# Patient Record
Sex: MALE | Race: WHITE | NOT HISPANIC OR LATINO | Employment: FULL TIME | URBAN - METROPOLITAN AREA
[De-identification: names, ages, dates, MRNs, and addresses within clinical notes are randomized per-mention and may not be internally consistent; named-entity substitution may affect disease eponyms.]

---

## 2017-11-09 ENCOUNTER — HOSPITAL ENCOUNTER (EMERGENCY)
Facility: HOSPITAL | Age: 32
Discharge: HOME | End: 2017-11-09
Attending: EMERGENCY MEDICINE
Payer: COMMERCIAL

## 2017-11-09 VITALS
TEMPERATURE: 98.3 F | RESPIRATION RATE: 18 BRPM | HEART RATE: 64 BPM | BODY MASS INDEX: 30.34 KG/M2 | OXYGEN SATURATION: 96 % | SYSTOLIC BLOOD PRESSURE: 154 MMHG | WEIGHT: 216.71 LBS | DIASTOLIC BLOOD PRESSURE: 66 MMHG | HEIGHT: 71 IN

## 2017-11-09 DIAGNOSIS — Z20.3 RABIES EXPOSURE: Primary | ICD-10-CM

## 2017-11-09 PROCEDURE — 90675 RABIES VACCINE IM: CPT | Performed by: PHYSICIAN ASSISTANT

## 2017-11-09 PROCEDURE — 90471 IMMUNIZATION ADMIN: CPT

## 2017-11-09 PROCEDURE — 96372 THER/PROPH/DIAG INJ SC/IM: CPT

## 2017-11-09 PROCEDURE — 90376 RABIES IG HEAT TREATED: CPT | Performed by: PHYSICIAN ASSISTANT

## 2017-11-09 PROCEDURE — 99283 EMERGENCY DEPT VISIT LOW MDM: CPT

## 2017-11-09 RX ORDER — ACETAMINOPHEN, ASPIRIN AND CAFFEINE 250; 250; 65 MG/1; MG/1; MG/1
1 TABLET, FILM COATED ORAL EVERY 6 HOURS PRN
COMMUNITY

## 2017-11-09 RX ADMIN — HUMAN RABIES VIRUS IMMUNE GLOBULIN 1965 UNITS: 150 INJECTION, SOLUTION INTRAMUSCULAR at 19:10

## 2017-11-09 RX ADMIN — RABIES VACCINE 1 ML: KIT at 19:14

## 2017-11-09 NOTE — ED PROVIDER NOTES
History  Chief Complaint   Patient presents with    Dog Bite     Pt presents to the ED with dog bit onset Monday night, pt was running at night when a passerby's dog bit him on the right leg  No information on the dog available  This 76-year-old white male presents emergency room for rabies vaccination  He was running, 4 days ago and was bitten by a stray dog  He has no access to his animal   He was seen by his regular doctor  Was given a tetanus immunization  He has no complaints at the wound site  His past medical history is negative  He has no fever chills  History provided by:  Patient  Rash   Location: right thigh  Quality: painful    Pain details:     Quality:  Aching    Severity:  Mild    Onset quality:  Sudden    Timing:  Constant    Progression:  Waxing and waning  Severity:  Mild  Onset quality:  Sudden  Duration:  4 days  Chronicity:  New  Context comment:  Dog bite  Worsened by:  Nothing  Ineffective treatments:  None tried  Associated symptoms: no fatigue, no fever, no joint pain and no myalgias        Prior to Admission Medications   Prescriptions Last Dose Informant Patient Reported? Taking?   aspirin-acetaminophen-caffeine (EXCEDRIN MIGRAINE) 250-250-65 MG per tablet   Yes Yes   Sig: Take 1 tablet by mouth every 6 (six) hours as needed for headaches      Facility-Administered Medications: None       History reviewed  No pertinent past medical history  Past Surgical History:   Procedure Laterality Date    EYE SURGERY         History reviewed  No pertinent family history  I have reviewed and agree with the history as documented  Social History   Substance Use Topics    Smoking status: Never Smoker    Smokeless tobacco: Never Used    Alcohol use Yes      Comment: Socially        Review of Systems   Constitutional: Negative for activity change, appetite change, chills, fatigue and fever  Musculoskeletal: Negative for arthralgias, gait problem and myalgias     Skin: Positive for color change and wound  Negative for rash  Psychiatric/Behavioral: Negative for confusion  All other systems reviewed and are negative  Physical Exam  ED Triage Vitals [11/09/17 1805]   Temperature Pulse Respirations Blood Pressure SpO2   98 3 °F (36 8 °C) 61 18 144/91 100 %      Temp Source Heart Rate Source Patient Position - Orthostatic VS BP Location FiO2 (%)   Oral Monitor Sitting Right arm --      Pain Score       1           Orthostatic Vital Signs  Vitals:    11/09/17 1805 11/09/17 1932   BP: 144/91 154/66   Pulse: 61 64   Patient Position - Orthostatic VS: Sitting Lying       Physical Exam   Constitutional: He is oriented to person, place, and time  He appears well-developed and well-nourished  No distress  HENT:   Head: Normocephalic  Right Ear: External ear normal    Left Ear: External ear normal    Nose: Nose normal    Eyes: Conjunctivae are normal  Right eye exhibits no discharge  Left eye exhibits no discharge  Pulmonary/Chest: Effort normal    Musculoskeletal: Normal range of motion  He exhibits no tenderness or deformity  Neurological: He is alert and oriented to person, place, and time  Skin: Skin is warm  No rash noted  He is not diaphoretic  No erythema  No pallor  Healing 0 5 cm laceration   Psychiatric: He has a normal mood and affect  His behavior is normal  Judgment and thought content normal    Nursing note and vitals reviewed        ED Medications  Medications   rabies immune globulin, human (IMOGAM RABIES-HT) IM injection 1,965 Units (1,965 Units Infiltration Given 11/9/17 1910)   rabies vaccine, chick embryo (RABAVERT) IM injection 1 mL (1 mL Intramuscular Given 11/9/17 1914)       Diagnostic Studies  Results Reviewed     None                 No orders to display              Procedures  Procedures       Phone Contacts  ED Phone Contact    ED Course  ED Course                                MDM  Number of Diagnoses or Management Options  Rabies exposure: new and does not require workup  Risk of Complications, Morbidity, and/or Mortality  Presenting problems: low  Diagnostic procedures: moderate  Management options: moderate  General comments: Patient presents to the emergency room 4 days after being bit by a stray dog  He was sent by his primary care provider to initiate the rabies vaccination series  He has a he healing laceration on his right lateral thigh  He was given a tetanus shot by his doctor  He was not placed on antibiotics  His wound is healing well  The rake and rapid for were initiated  He will follow up at [address] for his follow-up vaccines on day 3, 7, 14    Patient Progress  Patient progress: stable    CritCare Time    Disposition  Final diagnoses:   Rabies exposure - Healing dog bite right thigh     Time reflects when diagnosis was documented in both MDM as applicable and the Disposition within this note     Time User Action Codes Description Comment    11/9/2017  6:17 PM Keysha Nick Add [Z20 3] Rabies exposure     11/9/2017  6:17 PM Keysha Nick Modify [Z20 3] Rabies exposure Healing dog bite right thigh      ED Disposition     ED Disposition Condition Comment    Discharge  Dhruv Verma discharge to home/self care  Condition at discharge: Good        Follow-up Information     Follow up With Specialties Details Why [id number] Now   In 3 days, 7 days, 14 days         Discharge Medication List as of 11/9/2017  6:21 PM      CONTINUE these medications which have NOT CHANGED    Details   aspirin-acetaminophen-caffeine (EXCEDRIN MIGRAINE) 250-250-65 MG per tablet Take 1 tablet by mouth every 6 (six) hours as needed for headaches, Historical Med           No discharge procedures on file      ED Provider  Electronically Signed by           Forrest Nieves PA-C  11/09/17 6767

## 2017-11-09 NOTE — DISCHARGE INSTRUCTIONS
Rabies   WHAT YOU NEED TO KNOW:   Rabies is a disease that affects the body's central nervous system (brain, spinal cord, and nerves)  Rabies is caused by a virus  You may get the virus if you come into contact with the saliva or other tissue of an infected animal  Rabies infection usually happens through a bite wound  Animals that may spread rabies include dogs, cats, coyotes, raccoons, foxes, skunks, and bats  Rabies develops when the virus enters the skin and goes to the muscles or nerves  DISCHARGE INSTRUCTIONS:   Call 911 for any of the following:   · You have trouble swallowing or slurred speech  · You have double vision, or you see things that are not really there  · You begin twitching, have muscle cramps, or have a seizure  Return to the emergency department if:   · You think you were exposed to rabies  · You were bitten by an animal      · You feel weak, tired, dizzy, confused, restless, or anxious  Contact your healthcare provider if:   · You have a fever  · Your signs and symptoms do not get better after treatment  · You have questions or concerns about rabies and rabies treatment  Medicines:   · Medicines  such as the rabies vaccine or immune globulin may be given  These medicines help your body fight the virus and prevent rabies  · Take your medicine as directed  Contact your healthcare provider if you think your medicine is not helping or if you have side effects  Tell him or her if you are allergic to any medicine  Keep a list of the medicines, vitamins, and herbs you take  Include the amounts, and when and why you take them  Bring the list or the pill bottles to follow-up visits  Carry your medicine list with you in case of an emergency  Follow up with your healthcare provider as directed:  Write down your questions so you remember to ask them during your visits  Prevent rabies:   · Ask your healthcare provider about the rabies vaccine    You may need the vaccine if your work puts you at risk for rabies  You may also need the vaccine if you plan to travel to places where the risk for rabies is high  Ask for more information on rabies shots  · Avoid contact with animals  Do not approach any tame or wild animal that you do not know  Do not try to take them home with you  Cover windows and other openings in your home with screens so wild animals cannot get inside  · Get medical care if you get bitten by an animal   Do this even if the wound is very small  · Get your pet vaccinated against rabies  You will need to do this every 3 years or as directed by your   What to do if an animal bites you:  Clean the bite wound well and cover the wound with a clean bandage  Then contact your healthcare provider  © 2017 [address] Information is for End User's use only and may not be sold, redistributed or otherwise used for commercial purposes  All illustrations and images included in CareNotes® are the copyrighted property of A D [employer] M [employer] Inc  or Jared Ortiz  The above information is an  only  It is not intended as medical advice for individual conditions or treatments  Talk to your doctor, nurse or pharmacist before following any medical regimen to see if it is safe and effective for you

## 2017-11-13 ENCOUNTER — OFFICE VISIT (OUTPATIENT)
Dept: URGENT CARE | Age: 32
End: 2017-11-13
Payer: COMMERCIAL

## 2017-11-13 PROCEDURE — S9083 URGENT CARE CENTER GLOBAL: HCPCS | Performed by: FAMILY MEDICINE

## 2017-11-13 PROCEDURE — G0382 LEV 3 HOSP TYPE B ED VISIT: HCPCS | Performed by: FAMILY MEDICINE

## 2017-11-13 PROCEDURE — 90675 RABIES VACCINE IM: CPT

## 2017-11-14 NOTE — PROGRESS NOTES
Assessment    1  Need for rabies vaccination (V04 5) (Z23)    Plan  Need for rabies vaccination    · RabAvert Intramuscular Suspension Reconstituted; Give 1 ml IM now; To BeDone: [id number]    Discussion/Summary  Discussion Summary:   Continue vaccine series as directed  Medication Side Effects Reviewed: Possible side effects of new medications were reviewed with the patient/guardian today  Understands and agrees with treatment plan: The treatment plan was reviewed with the patient/guardian  The patient/guardian understands and agrees with the treatment plan      Chief Complaint  Chief Complaint Free Text Note Form: Pt is here for #2 in rabies booster series because of a dog bite to the right buttock  History of Present Illness  HPI: [de-identified] year male presents need for rabies vaccine #2  He states that he was jogging 1 week by a woman walking a dog, and the dog bit him on his right buttocks  He states he went to his family physician the next day, was given a Tdap vaccine and was told to go to the ER for the rabies vaccination series  He states he did not obtain the owners information or dog vaccination hx after the incident  He started the rabies vaccine series on 11/9/2017  He denies fevers, chills, headache, dizziness, nausea, vomiting  He feels well otherwise  He did not take the abx that were given to him from his PCP, because he states that the wound healed nicely  Hospital Based Practices Required Assessment:  Pain Assessment  the patient states they do not have pain  Abuse And Domestic Violence Screen   Yes, the patient is safe at home  -- The patient states no one is hurting them  Depression And Suicide Screen  No, the patient has not had thoughts of hurting themself  No, the patient has not felt depressed in the past 7 days  Review of Systems  Focused-Male:  Constitutional: no fever or chills, feels well, no tiredness, no recent weight loss or weight gain    Cardiovascular: no chest pain   Respiratory: no shortness of breath  Integumentary: as noted in HPI  Neurological: no headache,-- no numbness,-- no tingling,-- no confusion-- and-- no dizziness  ROS Reviewed:   ROS reviewed  Active Problems  1  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)    Past Medical History  Active Problems And Past Medical History Reviewed: The active problems and past medical history were reviewed and updated today  Family History  Father    1  Family history of Diabetes Mellitus (V18 0)   2  Family history of Heart Disease (V17 49)   3  Family history of Hypertension (V17 49)  Family History Reviewed: The family history was reviewed and updated today  Social History     · Denied: History of Alcohol Use (History)   · Denied: History of Drug Use   · Never A Smoker  Social History Reviewed: The social history was reviewed and updated today  The social history was reviewed and is unchanged  Surgical History  Surgical History Reviewed: The surgical history was reviewed and updated today  Current Meds   1  Excedrin Migraine TABS; Therapy: (Recorded:13Nov2017) to Recorded  Medication List Reviewed: The medication list was reviewed and updated today  Allergies  1  No Known Drug Allergies    2  Nuts    Vitals  Signs   Recorded: [id number] 12:29PM   Temperature: 98 2 F, Temporal  Heart Rate: 59  Respiration: 16  Blood Pressure: 120 mm Hg, LUE, Sitting  Blood Pressure: 88 mm Hg, LUE, Sitting  Height: 5 ft 11 in  Weight: 216 lb   BMI Calculated: 30 13 kg/m2  BSA Calculated: 2 18 m2  O2 Saturation: 98    Physical Exam   Constitutional  General appearance: No acute distress, well appearing and well nourished  Pulmonary  Respiratory effort: No increased work of breathing or signs of respiratory distress  Auscultation of lungs: Clear to auscultation  Cardiovascular  Auscultation of heart: Normal rate and rhythm, normal S1 and S2, without murmurs     Skin well healed puncture wounds on right buttocks  No pus drainage, erythema or warmth  Psychiatric  Orientation to person, place and time: Normal    Mood and affect: Normal        Future Appointments    Date/Time Provider Specialty Site   01/09/2018 03:30 PM JOHNNY Gupta   Urology [facility] Kwesi Quinn   Electronically signed by : Pan Bradley [facility]; Nov 13 2017 12:53PM EST                       (Author)    Electronically signed by : Sal Arceo DO; Nov 13 2017  3:54PM EST                       (Co-author)

## 2017-11-17 ENCOUNTER — OFFICE VISIT (OUTPATIENT)
Dept: URGENT CARE | Age: 32
End: 2017-11-17
Payer: COMMERCIAL

## 2017-11-17 PROCEDURE — 90675 RABIES VACCINE IM: CPT

## 2017-11-17 PROCEDURE — G0382 LEV 3 HOSP TYPE B ED VISIT: HCPCS | Performed by: FAMILY MEDICINE

## 2017-11-17 PROCEDURE — 96372 THER/PROPH/DIAG INJ SC/IM: CPT | Performed by: FAMILY MEDICINE

## 2017-11-17 PROCEDURE — S9083 URGENT CARE CENTER GLOBAL: HCPCS | Performed by: FAMILY MEDICINE

## 2017-11-19 NOTE — PROGRESS NOTES
Assessment    1  Need for rabies vaccination (V04 5) (Z23)    Discussion/Summary  Discussion Summary:   Rabies vaccination #3 given today  Patient scheduled for his 4th vaccination on 11/23/2017  Understands and agrees with treatment plan: The treatment plan was reviewed with the patient/guardian  The patient/guardian understands and agrees with the treatment plan   Counseling Documentation With Imm: The patient was counseled regarding instructions for management,-- prognosis,-- patient and family education,-- impressions,-- risks and benefits of treatment options,-- importance of compliance with treatment  Follow Up Instructions: Follow Up with your Primary Care Provider in 11/23/2017 days  If your symptoms worsen, go to the nearest [place] [address] Emergency Department  Chief Complaint  Chief Complaint Free Text Note Form: Here today for his 3 rd rabies shot in the series- has not had a problem thus far      History of Present Illness  HPI: Patient is here for his 3rd rabies vaccination  Patients vaccination was due yesterday but is unable to come in for evaluation  He denies any reaction to the previous vaccinations of that a slight sore arm  He denies any redness, swelling, fevers, difficulty breathing, throat swelling, rash, hives  Review of Systems  Focused-Male:  Constitutional: as noted in HPI  Respiratory: as noted in HPI  Musculoskeletal: as noted in HPI  Integumentary: as noted in HPI  Active Problems  1  Need for rabies vaccination (V04 5) (Z23)   2  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)    Family History  Father    1  Family history of Diabetes Mellitus (V18 0)   2  Family history of Heart Disease (V17 49)   3  Family history of Hypertension (V17 49)  Family History Reviewed: The family history was reviewed and updated today  Social History     · Denied: History of Alcohol Use (History)   · Denied: History of Drug Use   · Never A Smoker  Social History Reviewed:  The social history was reviewed and updated today  Current Meds   1  Excedrin Migraine TABS; Therapy: (Recorded:13Nov2017) to Recorded  Medication List Reviewed: The medication list was reviewed and updated today  Allergies  1  No Known Drug Allergies    2  Nuts    Vitals  Signs   Recorded: [id number] 12:25PM   Temperature: 98 4 F  Heart Rate: 64  Respiration: 20  Systolic: 929  Diastolic: 72  Height: 5 ft 11 in  Weight: 216 lb   BMI Calculated: 30 13  BSA Calculated: 2 18  O2 Saturation: 98  Pain Scale: 0    Physical Exam   Constitutional  General appearance: No acute distress, well appearing and well nourished  Eyes  Conjunctiva and lids: No swelling, erythema, or discharge  Pupils and irises: Equal, round and reactive to light  Skin  Skin and subcutaneous tissue: Normal without rashes or lesions  -- No erythema or swelling at the site of the prior vaccination  Psychiatric  Orientation to person, place and time: Normal    Mood and affect: Normal        Future Appointments    Date/Time Provider Specialty Site   01/09/2018 03:30 PM JOHNNY Pompa   Urology [facility] Fer Lima   Electronically signed by : Gallo Granda, [facility]; Nov 17 2017 12:55PM EST                       (Author)    Electronically signed by : Viviane Smith DO; Nov 17 2017  4:07PM EST                       (Co-author)

## 2017-11-27 ENCOUNTER — OFFICE VISIT (OUTPATIENT)
Dept: URGENT CARE | Age: 32
End: 2017-11-27
Payer: COMMERCIAL

## 2017-11-27 PROCEDURE — 96372 THER/PROPH/DIAG INJ SC/IM: CPT | Performed by: FAMILY MEDICINE

## 2017-11-27 PROCEDURE — 90675 RABIES VACCINE IM: CPT

## 2017-11-27 PROCEDURE — G0381 LEV 2 HOSP TYPE B ED VISIT: HCPCS | Performed by: FAMILY MEDICINE

## 2017-11-27 PROCEDURE — S9083 URGENT CARE CENTER GLOBAL: HCPCS | Performed by: FAMILY MEDICINE

## 2017-11-28 NOTE — PROGRESS NOTES
Assessment    1  Need for rabies vaccination (V04 5) (Z23)    Discussion/Summary  Discussion Summary:   Tylenol, or Advil/ Motrin as needed  recheck if any problems  Understands and agrees with treatment plan: The treatment plan was reviewed with the patient/guardian  The patient/guardian understands and agrees with the treatment plan   Counseling Documentation With Imm: The patient was counseled regarding  Chief Complaint  Chief Complaint Free Text Note Form: Last rabies shot  left deltoid  History of Present Illness  HPI: Patient here for his 4th rabies vaccine injection - patient states he had chills with his 3rd rabies vaccine injection (no other problems per patient)   Hospital Based Practices Required Assessment:  Pain Assessment  the patient states they do not have pain  Abuse And Domestic Violence Screen   No, the patient is not safe at home  -- The patient states no one is hurting them  Depression And Suicide Screen  No, the patient has not had thoughts of hurting themself  No, the patient has not felt depressed in the past 7 days  Review of Systems  Focused-Male:  Constitutional: chills, but-- as noted in HPI   ENT: no complaints of earache, no loss of hearing, no nosebleeds or nasal discharge, no sore throat or hoarseness  Cardiovascular: no complaints of slow or fast heart rate, no chest pain, no palpitations, no leg claudication or lower extremity edema  Respiratory: no complaints of shortness of breath, no wheezing or cough, no dyspnea on exertion, no orthopnea or PND  Gastrointestinal: no complaints of abdominal pain, no constipation, no nausea or vomiting, no diarrhea or bloody stools  Genitourinary: no complaints of dysuria or incontinence, no hesitancy, no nocturia, no genital lesion, no inadequacy of penile erection  Musculoskeletal: no complaints of arthralgia, no myalgia, no joint swelling or stiffness, no limb pain or swelling    Integumentary: no complaints of skin rash or lesion, no itching or dry skin, no skin wounds  Neurological: no complaints of headache, no confusion, no numbness or tingling, no dizziness or fainting  ROS Reviewed:   ROS reviewed  Active Problems  1  Need for rabies vaccination (V04 5) (Z23)   2  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)    Past Medical History  Active Problems And Past Medical History Reviewed: The active problems and past medical history were reviewed and updated today  Family History  Father    1  Family history of Diabetes Mellitus (V18 0)   2  Family history of Heart Disease (V17 49)   3  Family history of Hypertension (V17 49)  Family History Reviewed: The family history was reviewed and updated today  Social History     · Denied: History of Alcohol Use (History)   · Denied: History of Drug Use   · Never A Smoker  Social History Reviewed: The social history was reviewed and updated today  The social history was reviewed and is unchanged  Surgical History  Surgical History Reviewed: The surgical history was reviewed and updated today  Current Meds   1  Excedrin Migraine TABS; Therapy: (Recorded:13Nov2017) to Recorded  Medication List Reviewed: The medication list was reviewed and updated today  Allergies  1  No Known Drug Allergies  2  Nuts    Vitals  Signs   Recorded: 27Nov2017 12:27PM   Temperature: 98 1 F, Temporal  Heart Rate: 60  Respiration: 18  Systolic: 376  Diastolic: 80  Height: 5 ft 11 in  Weight: 216 lb   BMI Calculated: 30 13  BSA Calculated: 2 18  O2 Saturation: 98    Future Appointments    Date/Time Provider Specialty Site   01/09/2018 03:30 PM JOHNNY Amador   Urology [facility] Kelly Ordoñez   Electronically signed by : Altaf Wiley DO; Nov 27 2017 12:37PM EST                       (Author)

## 2018-01-09 ENCOUNTER — GENERIC CONVERSION - ENCOUNTER (OUTPATIENT)
Dept: OTHER | Facility: OTHER | Age: 33
End: 2018-01-09

## 2018-01-24 VITALS
WEIGHT: 221.25 LBS | SYSTOLIC BLOOD PRESSURE: 124 MMHG | HEART RATE: 64 BPM | HEIGHT: 72 IN | BODY MASS INDEX: 29.97 KG/M2 | DIASTOLIC BLOOD PRESSURE: 82 MMHG

## 2018-02-06 PROBLEM — N50.89 LUMP IN SCROTUM: Status: ACTIVE | Noted: 2018-01-09

## 2018-02-06 NOTE — PROGRESS NOTES
2/9/2018    Tamiko Welch IV  1985  [id number]    Discussion and Plan    Patient underwent successful excision of a penile inclusion cyst   Post procedure instructions provided  Script for Cialis sent to pharmacy  1  Testicular hypogonadism     2  Lump in scrotum     3  Erectile dysfunction, unspecified erectile dysfunction type  tadalafil (CIALIS) 5 MG tablet       Assessment      Patient Active Problem List   Diagnosis    Lump in scrotum    Testicular hypogonadism       History of Present Illness    Les Sims is a 33 y o  male seen today in regards to a history of hypogonadism  He reports a several year history of variable libido and erectile dysfunction  Otherwise normal energy  No urinary complaints  Denies prior steroid or illicit drug use  Normal pubertal development  He since has been treated with Cialis and has had excellent results  He more recently noted a firm lump on the distal aspect of the penis  Occasionally tender  Has been present for some time  No urinary complaints  No prior sexually transmitted diseases  Past Medical History  History reviewed  No pertinent past medical history  Past Social History  Past Surgical History:   Procedure Laterality Date    EYE SURGERY         Past Family History  Family History   Problem Relation Age of Onset    Diabetes Father     Heart disease Father     Hypertension Father        Past Social history  Social History     Social History    Marital status: Single     Spouse name: N/A    Number of children: N/A    Years of education: N/A     Occupational History    Not on file       Social History Main Topics    Smoking status: Never Smoker    Smokeless tobacco: Never Used    Alcohol use Yes      Comment: Socially    Drug use: No    Sexual activity: Not on file     Other Topics Concern    Not on file     Social History Narrative    No narrative on file       Current Medications  Current Outpatient Prescriptions   Medication Sig Dispense Refill    aspirin-acetaminophen-caffeine (EXCEDRIN MIGRAINE) 250-250-65 MG per tablet Take 1 tablet by mouth every 6 (six) hours as needed for headaches      aspirin-acetaminophen-caffeine (EXCEDRIN MIGRAINE) 250-250-65 MG per tablet Take by mouth as needed      tadalafil (CIALIS) 5 MG tablet Take 1 tablet (5 mg total) by mouth daily as needed for erectile dysfunction 10 tablet 6     No current facility-administered medications for this visit  Allergies  Allergies   Allergen Reactions    Nuts        Past Medical History, Social History, Family History, medications and allergies were reviewed  Review of Systems  Review of Systems   Constitutional: Negative  HENT: Negative  Eyes: Negative  Respiratory: Negative  Cardiovascular: Negative  Gastrointestinal: Negative  Endocrine: Negative  Musculoskeletal: Negative  Skin: Negative  Neurological: Negative  Hematological: Negative  Psychiatric/Behavioral: Negative  Vitals  Vitals:    02/09/18 0730   BP: 126/90   Pulse: 72   Weight: 102 kg (225 lb 6 4 oz)   Height: 6' (1 829 m)         Physical Exam    Physical Exam   Constitutional: He is oriented to person, place, and time  He appears well-developed and well-nourished  HENT:   Head: Normocephalic and atraumatic  Eyes: Pupils are equal, round, and reactive to light  Neck: Normal range of motion  Cardiovascular: Normal rate, regular rhythm and normal heart sounds  Pulmonary/Chest: Effort normal and breath sounds normal  No accessory muscle usage  No respiratory distress  Abdominal: Soft  Normal appearance and bowel sounds are normal  There is no tenderness  Genitourinary: Rectum normal, prostate normal and penis normal  No penile tenderness  Musculoskeletal: Normal range of motion  Neurological: He is alert and oriented to person, place, and time  Skin: Skin is warm, dry and intact  Psychiatric: He has a normal mood and affect   His speech is normal  Cognition and memory are normal    Nursing note and vitals reviewed  PROCEDURE/  EXCISION PENILE LESION    After informed consent, patient was placed supine in the procedure suite  He was sterilely prepped and draped in the usual fashion  An inclusion cyst of approximately 7 mm was noted at the dorsal phallus near the coronal sulcus  1% lidocaine without epinephrine was instilled in this area  Excision of the formation lesion was performed with a 15 blade and sent for permanent section  Cautery of the wound was performed followed by closure with 3-0 chromic suture  Sterile dressing was applied  Patient tolerated the procedure well

## 2018-02-07 RX ORDER — ACETAMINOPHEN, ASPIRIN AND CAFFEINE 250; 250; 65 MG/1; MG/1; MG/1
TABLET, FILM COATED ORAL AS NEEDED
COMMUNITY
End: 2018-04-03 | Stop reason: SDUPTHER

## 2018-02-09 ENCOUNTER — PROCEDURE VISIT (OUTPATIENT)
Dept: UROLOGY | Facility: CLINIC | Age: 33
End: 2018-02-09
Payer: COMMERCIAL

## 2018-02-09 VITALS
BODY MASS INDEX: 30.53 KG/M2 | DIASTOLIC BLOOD PRESSURE: 90 MMHG | HEIGHT: 72 IN | SYSTOLIC BLOOD PRESSURE: 126 MMHG | WEIGHT: 225.4 LBS | HEART RATE: 72 BPM

## 2018-02-09 DIAGNOSIS — N50.89 LUMP IN SCROTUM: ICD-10-CM

## 2018-02-09 DIAGNOSIS — E29.1 TESTICULAR HYPOGONADISM: Primary | ICD-10-CM

## 2018-02-09 DIAGNOSIS — N52.9 ERECTILE DYSFUNCTION, UNSPECIFIED ERECTILE DYSFUNCTION TYPE: ICD-10-CM

## 2018-02-09 DIAGNOSIS — N48.9 PENILE LESION: ICD-10-CM

## 2018-02-09 PROCEDURE — 54060 EXCISION OF PENIS LESION(S): CPT | Performed by: UROLOGY

## 2018-02-09 PROCEDURE — 88342 IMHCHEM/IMCYTCHM 1ST ANTB: CPT | Performed by: PATHOLOGY

## 2018-02-09 PROCEDURE — 88305 TISSUE EXAM BY PATHOLOGIST: CPT | Performed by: UROLOGY

## 2018-02-09 PROCEDURE — 88305 TISSUE EXAM BY PATHOLOGIST: CPT | Performed by: PATHOLOGY

## 2018-02-09 PROCEDURE — 88342 IMHCHEM/IMCYTCHM 1ST ANTB: CPT | Performed by: UROLOGY

## 2018-02-09 PROCEDURE — 88341 IMHCHEM/IMCYTCHM EA ADD ANTB: CPT | Performed by: UROLOGY

## 2018-02-09 PROCEDURE — 88341 IMHCHEM/IMCYTCHM EA ADD ANTB: CPT | Performed by: PATHOLOGY

## 2018-02-09 RX ORDER — TADALAFIL 5 MG/1
5 TABLET ORAL DAILY PRN
Qty: 10 TABLET | Refills: 6 | Status: SHIPPED | OUTPATIENT
Start: 2018-02-09 | End: 2018-04-03 | Stop reason: SDUPTHER

## 2018-02-09 NOTE — PATIENT INSTRUCTIONS
Dermal Cyst Excision   WHAT YOU NEED TO KNOW:   A dermal cyst excision is a procedure to remove a cyst that has grown under your skin  DISCHARGE INSTRUCTIONS:   Medicines:   · Medicines  help decrease pain or swelling, and prevent an infection caused by bacteria  · Take your medicine as directed  Contact your healthcare provider if you think your medicine is not helping or if you have side effects  Tell him or her if you are allergic to any medicine  Keep a list of the medicines, vitamins, and herbs you take  Include the amounts, and when and why you take them  Bring the list or the pill bottles to follow-up visits  Carry your medicine list with you in case of an emergency  Wound care:  Care for your wound as directed  Carefully wash the wound with soap and water  Pat the area dry with a clean towel if you do not have a bandage  Dry the area and put on new, clean bandages as directed  Change your bandages when they get wet or dirty  Follow up with your healthcare provider or surgeon as directed: You may need to return to have your stitches removed  Write down your questions so you remember to ask them during your visits  Contact your healthcare provider or surgeon if:   · You have a fever  · You have worse pain, even after you take medicine  · You have a new cyst     · You have bruises that do not go away  · The skin around your wound is numb  · You have questions or concerns about your condition or care  Seek care immediately or call 911 if:   · You have severe pain  · Your stitches come apart  · Blood soaks through your bandage  © 2017 [address] [employer] Information is for End User's use only and may not be sold, redistributed or otherwise used for commercial purposes  All illustrations and images included in CareNotes® are the copyrighted property of [employer] , [employer]  or Jared Ortiz  The above information is an  only   It is not intended as medical advice for individual conditions or treatments  Talk to your doctor, nurse or pharmacist before following any medical regimen to see if it is safe and effective for you

## 2018-03-30 NOTE — PROGRESS NOTES
4/3/2018    Yudelka Gut IV  1985  [id number]    Discussion and Plan    Patient continues to do well status post excision of a penile inclusion cyst   Cialis script renewed  He will return in 1 year for routine visit  1  Testicular hypogonadism    2  Lump in scrotum    3  Erectile dysfunction, unspecified erectile dysfunction type  - tadalafil (CIALIS) 5 MG tablet; Take 1 tablet (5 mg total) by mouth daily as needed for erectile dysfunction  Dispense: 10 tablet; Refill: 12    Assessment      Patient Active Problem List   Diagnosis    Lump in scrotum    Testicular hypogonadism       History of Present Illness    Prince messina is a 33 y o  male seen today in regards to a history of hypogonadism  He reports a several year history of variable libido and erectile dysfunction  Otherwise normal energy  No urinary complaints  Denies prior steroid or illicit drug use  Normal pubertal development  He since has been treated with Cialis and has had excellent results  He more recently noted a firm lump on the distal aspect of the penis  Occasionally tender  Has been present for some time  No urinary complaints  No prior sexually transmitted diseases  Recently underwent in office excision of a inclusion scrotal cyst       Urinary Symptom Assessment      Past Medical History  History reviewed  No pertinent past medical history  Past Social History  Past Surgical History:   Procedure Laterality Date    EYE SURGERY      PENILE CYST REMOVAL  02/09/2018    Dr Ceasar Webb        Past Family History  Family History   Problem Relation Age of Onset    Diabetes Father     Heart disease Father     Hypertension Father        Past Social history  Social History     Social History    Marital status: Single     Spouse name: N/A    Number of children: N/A    Years of education: N/A     Occupational History    Not on file       Social History Main Topics    Smoking status: Never Smoker    Smokeless tobacco: Never Used   Waldemar Roles Alcohol use Yes      Comment: Socially    Drug use: No    Sexual activity: Not on file     Other Topics Concern    Not on file     Social History Narrative    No narrative on file       Current Medications  Current Outpatient Prescriptions   Medication Sig Dispense Refill    aspirin-acetaminophen-caffeine (EXCEDRIN MIGRAINE) 250-250-65 MG per tablet Take 1 tablet by mouth every 6 (six) hours as needed for headaches      tadalafil (CIALIS) 5 MG tablet Take 1 tablet (5 mg total) by mouth daily as needed for erectile dysfunction 10 tablet 12     No current facility-administered medications for this visit  Allergies  Allergies   Allergen Reactions    Nuts        Past Medical History, Social History, Family History, medications and allergies were reviewed  Review of Systems  Review of Systems   Constitutional: Negative  HENT: Negative  Eyes: Negative  Respiratory: Negative  Cardiovascular: Negative  Gastrointestinal: Negative  Endocrine: Negative  Musculoskeletal: Negative  Skin: Negative  Neurological: Negative  Hematological: Negative  Psychiatric/Behavioral: Negative  Vitals  Vitals:    04/03/18 0750   BP: 130/80   BP Location: Left arm   Patient Position: Sitting   Weight: 101 kg (223 lb)   Height: 6' (1 829 m)         Physical Exam    Physical Exam   Constitutional: He is oriented to person, place, and time  He appears well-developed and well-nourished  HENT:   Head: Normocephalic and atraumatic  Eyes: Pupils are equal, round, and reactive to light  Neck: Normal range of motion  Cardiovascular: Normal rate, regular rhythm and normal heart sounds  Pulmonary/Chest: Effort normal and breath sounds normal  No accessory muscle usage  No respiratory distress  Abdominal: Soft  Normal appearance and bowel sounds are normal  There is no tenderness  Genitourinary: Rectum normal, prostate normal and penis normal  No penile tenderness     Genitourinary Comments: Surgical site healing well  Musculoskeletal: Normal range of motion  Neurological: He is alert and oriented to person, place, and time  Skin: Skin is warm, dry and intact  Psychiatric: He has a normal mood and affect  His speech is normal  Cognition and memory are normal    Nursing note and vitals reviewed        Results    No results found for: PSA  No results found for: GLUCOSE, CALCIUM, NA, K, CO2, CL, BUN, CREATININE  No results found for: WBC, HGB, HCT, MCV, PLT    No results found for this or any previous visit (from the past 1 hour(s)) ]

## 2018-04-03 ENCOUNTER — OFFICE VISIT (OUTPATIENT)
Dept: UROLOGY | Facility: CLINIC | Age: 33
End: 2018-04-03
Payer: COMMERCIAL

## 2018-04-03 VITALS
HEIGHT: 72 IN | SYSTOLIC BLOOD PRESSURE: 130 MMHG | WEIGHT: 223 LBS | BODY MASS INDEX: 30.2 KG/M2 | DIASTOLIC BLOOD PRESSURE: 80 MMHG

## 2018-04-03 DIAGNOSIS — N50.89 LUMP IN SCROTUM: ICD-10-CM

## 2018-04-03 DIAGNOSIS — N52.9 ERECTILE DYSFUNCTION, UNSPECIFIED ERECTILE DYSFUNCTION TYPE: ICD-10-CM

## 2018-04-03 DIAGNOSIS — E29.1 TESTICULAR HYPOGONADISM: Primary | ICD-10-CM

## 2018-04-03 PROCEDURE — 99212 OFFICE O/P EST SF 10 MIN: CPT | Performed by: UROLOGY

## 2018-04-03 RX ORDER — TADALAFIL 5 MG/1
5 TABLET ORAL DAILY PRN
Qty: 10 TABLET | Refills: 12 | Status: SHIPPED | OUTPATIENT
Start: 2018-04-03

## 2021-04-05 ENCOUNTER — OFFICE VISIT (OUTPATIENT)
Dept: URGENT CARE | Facility: CLINIC | Age: 36
End: 2021-04-05
Payer: COMMERCIAL

## 2021-04-05 VITALS
HEART RATE: 60 BPM | HEIGHT: 72 IN | SYSTOLIC BLOOD PRESSURE: 138 MMHG | TEMPERATURE: 99 F | DIASTOLIC BLOOD PRESSURE: 76 MMHG | BODY MASS INDEX: 28.44 KG/M2 | RESPIRATION RATE: 15 BRPM | OXYGEN SATURATION: 96 % | WEIGHT: 210 LBS
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
## 2021-04-05 PROCEDURE — 99213 OFFICE O/P EST LOW 20 MIN: CPT | Performed by: NURSE PRACTITIONER

## 2021-04-05 RX ORDER — DOXYCYCLINE 100 MG/1
100 TABLET ORAL 2 TIMES DAILY
Qty: 14 TABLET | Refills: 0 | Status: SHIPPED | OUTPATIENT
Start: 2021-04-05 | End: 2021-04-12

## 2021-04-05 RX ORDER — METRONIDAZOLE 500 MG/1
500 TABLET ORAL EVERY 8 HOURS SCHEDULED
Qty: 21 TABLET | Refills: 0 | Status: SHIPPED | OUTPATIENT
Start: 2021-04-05 | End: 2021-04-12

## 2021-04-05 NOTE — PATIENT INSTRUCTIONS
--Ice, warm soaks  --Motrin as needed for pain  --Take preventative antibiotics as directed  --F/u with animal control as discussed  Go directly to ER if any concern about rabies  --Go directly to ER with any signs of worsening hand infection (redness, pain, streaks, swelling)

## 2021-04-05 NOTE — PROGRESS NOTES
[facility]        NAME: Pretty Soot is a 30 y o  male  : 1985    MRN: [id number]  DATE: 2021  TIME: 6:11 PM    Assessment and Plan   Dog bite, initial encounter [W54  0XXA]  1  Dog bite, initial encounter  doxycycline (ADOXA) 100 MG tablet    metroNIDAZOLE (FLAGYL) 500 mg tablet     --Preventative antibiotic given  Previous AE's from Augmentin  Requesting alternative  Advised monitoring for  S/Sx infection which were reviewed  --UTD with tetanus vaccine  --Reviewed rabies guidelines including observation of animal for 10 days, verification of vaccination records  Advise ER immediately for RIG + vaccine if unable to verify vaccine status and/or animal exhibits unusual behavior during vaccination period and/or S/Sx rabies develop (reviewed)  Previously vaccinated, so would be considered low risk, however  --Patient verbalized understanding of instructions  Patient Instructions     --Ice, warm soaks  --Motrin as needed for pain  --Take preventative antibiotics as directed  --F/u with animal control as discussed  Go directly to ER if any concern about rabies  --Go directly to ER with any signs of worsening hand infection (redness, pain, streaks, swelling)  Chief Complaint     Chief Complaint   Patient presents with    Animal Bite     Pt was bitten by  hilarios dog on his right hand with puncture wounds- he is not sure if the dog is up to date with rabies vaccine and he is aware of  consequences of animal not being vaccinated  He is not concenred at this point about that  History of Present Illness       Here with complaints of dog bite on right hand  Went to pet neighbor's black lab yesterday  Dog inadvertently got "zapped" by invisible fence at the same time and bit down on his hand as a result  Bite lasted no more than 1-2 weeks  Dog released on his own  Small amount of bleeding from puncture wounds on top and bottom of hand     Went home and washed with soap and water right away  Feels a bit better today, but still swollen  Rates pain 1/10  Applying Neosporin  No numbness, tingling, weakness  Dog is 11years old, domesticated, normal behavior  Fully vaccinated as far as he knows, but still waiting to hear back from vet to ascertain  He was fully vaccinated against rabies 3 1/2 years ago at time of another dog bite  Received tetanus at same time  Previous bad GI upset with Augmentin  Review of Systems   Review of Systems   Constitutional: Negative for fever  Skin: Positive for wound  Neurological: Negative for numbness  Current Medications       Current Outpatient Medications:     aspirin-acetaminophen-caffeine (EXCEDRIN MIGRAINE) 250-250-65 MG per tablet, Take 1 tablet by mouth every 6 (six) hours as needed for headaches, Disp: , Rfl:     doxycycline (ADOXA) 100 MG tablet, Take 1 tablet (100 mg total) by mouth 2 (two) times a day for 7 days, Disp: 14 tablet, Rfl: 0    metroNIDAZOLE (FLAGYL) 500 mg tablet, Take 1 tablet (500 mg total) by mouth every 8 (eight) hours for 7 days, Disp: 21 tablet, Rfl: 0    tadalafil (CIALIS) 5 MG tablet, Take 1 tablet (5 mg total) by mouth daily as needed for erectile dysfunction, Disp: 10 tablet, Rfl: 12    Current Allergies     Allergies as of 04/05/2021 - Reviewed 04/05/2021   Allergen Reaction Noted    Nuts - food allergy  11/09/2017            The following portions of the patient's history were reviewed and updated as appropriate: allergies, current medications, past family history, past medical history, past social history, past surgical history and problem list      History reviewed  No pertinent past medical history      Past Surgical History:   Procedure Laterality Date    EYE SURGERY      PENILE CYST REMOVAL  02/09/2018    Dr Sean Garza        Family History   Problem Relation Age of Onset    Diabetes Father     Heart disease Father     Hypertension Father Medications have been verified  Objective   /76   Pulse 60   Temp 99 °F (37 2 °C)   Resp 15   Ht 6' (1 829 m)   Wt 95 3 kg (210 lb)   SpO2 96%   BMI 28 48 kg/m²   No LMP for male patient  Physical Exam     Physical Exam  Constitutional:       General: He is not in acute distress  Appearance: He is not ill-appearing  Musculoskeletal:         General: Swelling, tenderness and signs of injury present  Comments: Dorsa of right hand with two small (2-5 mm) punctuate, benign appearing bite marks overlying second metatarsal and 2nd/3rd interdigital space  Mild overlying/surrounding swelling, tenderness, pink skin  Palm of right hand with two small (5 mm) punctuate, linear benign appearing bite marks overlying 2nd and 3rd metatarsal  Mild bruising, swelling  No active bleeding or drainage  No streaks  Remainder of right hand, including fingers, nontender with normal appearance  No wrist tenderness  Fingers with normal temp, color, sensation, cap refill  5/5 strength flexion and extension  Skin:     Comments: See MUSCULO   Neurological:      Mental Status: He is alert

## 2021-04-06 DIAGNOSIS — Z23 ENCOUNTER FOR IMMUNIZATION: ICD-10-CM

## 2024-08-29 ENCOUNTER — RA CDI HCC (OUTPATIENT)
Dept: OTHER | Facility: HOSPITAL | Age: 39
End: 2024-08-29

## 2024-09-06 ENCOUNTER — OFFICE VISIT (OUTPATIENT)
Dept: FAMILY MEDICINE CLINIC | Facility: CLINIC | Age: 39
End: 2024-09-06

## 2024-09-06 VITALS
HEART RATE: 74 BPM | BODY MASS INDEX: 30.75 KG/M2 | HEIGHT: 72 IN | RESPIRATION RATE: 16 BRPM | WEIGHT: 227 LBS | OXYGEN SATURATION: 98 % | SYSTOLIC BLOOD PRESSURE: 128 MMHG | DIASTOLIC BLOOD PRESSURE: 78 MMHG

## 2024-09-06 DIAGNOSIS — G47.00 DISORDER OF INITIATING AND MAINTAINING SLEEP: ICD-10-CM

## 2024-09-06 DIAGNOSIS — F41.8 SITUATIONAL ANXIETY: ICD-10-CM

## 2024-09-06 DIAGNOSIS — E66.9 OBESITY, CLASS I, BMI 30-34.9: ICD-10-CM

## 2024-09-06 DIAGNOSIS — Z00.00 ENCOUNTER FOR ANNUAL PHYSICAL EXAM: Primary | ICD-10-CM

## 2024-09-06 PROBLEM — N50.89 LUMP IN SCROTUM: Status: RESOLVED | Noted: 2018-01-09 | Resolved: 2024-09-06

## 2024-09-06 PROBLEM — E66.811 OBESITY, CLASS I, BMI 30-34.9: Status: ACTIVE | Noted: 2024-09-06

## 2024-09-06 RX ORDER — LORAZEPAM 0.5 MG/1
0.5 TABLET ORAL EVERY 8 HOURS PRN
Qty: 40 TABLET | Refills: 1 | Status: SHIPPED | OUTPATIENT
Start: 2024-09-06

## 2024-09-06 NOTE — ASSESSMENT & PLAN NOTE
Annual physical examination performed    -Order provided for screening blood work including CBC, CMP, TSH and lipid panel.  With obesity will check hemoglobin A1c    -Will contact patient with results of testing

## 2024-09-06 NOTE — ASSESSMENT & PLAN NOTE
Patient is interested in possibly going on GLP-1 agonist.  Insurance will cover [employer].  I advised that he would have blood work completed first to check hemoglobin A1c, TSH

## 2024-09-06 NOTE — PROGRESS NOTES
Subjective:      Patient ID: Billy Barroso IV is a 38 y.o. male.    38-year-old male presents as new patient to the practice to establish and for annual physical examination.  Patient generally feels that he is in good health.  He does have significant amount of work related stress and anxiety.  He is a .  Prior PCP retired.  At 1 point in time he was placed on Zoloft and he hated the way it made him feel.  Has never been on any other medications.  There are times when he does not sleep well at night or feels overwhelmed during the day at work.  This is not all the time.  If he has a big case going to trial then the night before he might not be able to sleep.  Normally he is able to fall asleep but unable to stay asleep and will wake up at 3 AM and cannot fall back to sleep.  Patient would also be interested in GLP-1 agonist for weight loss.        No past medical history on file.    Family History   Problem Relation Age of Onset   • Diabetes Father    • Heart disease Father    • Hypertension Father        Past Surgical History:   Procedure Laterality Date   • EYE SURGERY     • PENILE CYST REMOVAL  02/09/2018    Dr. Song         reports that he has never smoked. He has never used smokeless tobacco. He reports current alcohol use. He reports that he does not use drugs.      Current Outpatient Medications:   •  LORazepam (Ativan) 0.5 mg tablet, Take 1 tablet (0.5 mg total) by mouth every 8 (eight) hours as needed for anxiety, Disp: 40 tablet, Rfl: 1    The following portions of the patient's history were reviewed and updated as appropriate: allergies, current medications, past family history, past medical history, past social history, past surgical history and problem list.    Review of Systems   Constitutional: Negative.    HENT: Negative.     Eyes: Negative.    Respiratory: Negative.     Cardiovascular: Negative.    Gastrointestinal: Negative.    Endocrine: Negative.    Genitourinary: Negative.     Musculoskeletal: Negative.    Skin: Negative.    Allergic/Immunologic: Negative.    Neurological: Negative.    Hematological: Negative.    Psychiatric/Behavioral:  Positive for sleep disturbance. The patient is nervous/anxious.    All other systems reviewed and are negative.          Objective:    /78   Pulse 74   Resp 16   Ht 6' (1.829 m)   Wt 103 kg (227 lb)   SpO2 98%   BMI 30.79 kg/m²      Physical Exam  Vitals and nursing note reviewed.   Constitutional:       General: He is not in acute distress.     Appearance: Normal appearance. He is well-developed. He is obese. He is not ill-appearing.   HENT:      Head: Normocephalic and atraumatic.      Right Ear: Tympanic membrane, ear canal and external ear normal.      Left Ear: Tympanic membrane, ear canal and external ear normal.      Nose: Nose normal.      Mouth/Throat:      Mouth: Mucous membranes are moist.   Eyes:      Extraocular Movements: Extraocular movements intact.      Conjunctiva/sclera: Conjunctivae normal.      Pupils: Pupils are equal, round, and reactive to light.   Cardiovascular:      Rate and Rhythm: Normal rate and regular rhythm.      Pulses: Normal pulses.      Heart sounds: Normal heart sounds. No murmur heard.  Pulmonary:      Effort: Pulmonary effort is normal.      Breath sounds: Normal breath sounds.   Abdominal:      General: Abdomen is flat. Bowel sounds are normal.      Palpations: Abdomen is soft.   Musculoskeletal:         General: Normal range of motion.      Cervical back: Normal range of motion and neck supple.   Skin:     General: Skin is warm and dry.   Neurological:      General: No focal deficit present.      Mental Status: He is alert and oriented to person, place, and time.   Psychiatric:         Mood and Affect: Mood normal.         Behavior: Behavior normal.         Thought Content: Thought content normal.         Judgment: Judgment normal.           No results found for this or any previous visit (from the  past 1008 hour(s)).    Assessment/Plan:    Situational anxiety  - Patient does have work-related stress, anxiety which does affect his ability to sleep at night    Patient did not like being on sertraline.  Would not be interested in SSRI class of medication.  Other options for treatment would include BuSpar or even Wellbutrin    -Patient given as needed lorazepam to take on an as-needed basis.  Explained to the patient that he should not have any heavy responsibility or significant travel that he needs to do until he knows how taking the medication will make him feel.    Prior to prescribing the controlled substance, a patient search was performed on the [place] prescription drug monitoring program web site.  There was no evidence of diversion or misuse.  Prescription provided      Disorder of initiating and maintaining sleep  Sleep hygiene was discussed    -Patient given a limited supply of as needed lorazepam.  Explained the patient the negative action of benzodiazepines.  He should try taking this on the weekend know how will make him feel    Obesity, Class I, BMI 30-34.9  Patient is interested in possibly going on GLP-1 agonist.  Insurance will cover Wegovy.  I advised that he would have blood work completed first to check hemoglobin A1c, TSH    Encounter for annual physical exam  Annual physical examination performed    -Order provided for screening blood work including CBC, CMP, TSH and lipid panel.  With obesity will check hemoglobin A1c    -Will contact patient with results of testing          Problem List Items Addressed This Visit        Behavioral Health    Situational anxiety     - Patient does have work-related stress, anxiety which does affect his ability to sleep at night    Patient did not like being on sertraline.  Would not be interested in SSRI class of medication.  Other options for treatment would include BuSpar or even Wellbutrin    -Patient given as needed lorazepam to take on an  as-needed basis.  Explained to the patient that he should not have any heavy responsibility or significant travel that he needs to do until he knows how taking the medication will make him feel.    Prior to prescribing the controlled substance, a patient search was performed on the [place] prescription drug monitoring program web site.  There was no evidence of diversion or misuse.  Prescription provided           Relevant Medications    LORazepam (Ativan) 0.5 mg tablet    Other Relevant Orders    TSH, 3rd generation with Free T4 reflex       Neurology/Sleep    Disorder of initiating and maintaining sleep     Sleep hygiene was discussed    -Patient given a limited supply of as needed lorazepam.  Explained the patient the negative action of benzodiazepines.  He should try taking this on the weekend know how will make him feel            Other    Encounter for annual physical exam - Primary     Annual physical examination performed    -Order provided for screening blood work including CBC, CMP, TSH and lipid panel.  With obesity will check hemoglobin A1c    -Will contact patient with results of testing         Relevant Orders    CBC and differential    Comprehensive metabolic panel    Hemoglobin A1C    Lipid panel    TSH, 3rd generation with Free T4 reflex    Obesity, Class I, BMI 30-34.9     Patient is interested in possibly going on GLP-1 agonist.  Insurance will cover Wegovy.  I advised that he would have blood work completed first to check hemoglobin A1c, TSH         Relevant Orders    Hemoglobin A1C

## 2024-09-06 NOTE — ASSESSMENT & PLAN NOTE
- Patient does have work-related stress, anxiety which does affect his ability to sleep at night    Patient did not like being on sertraline.  Would not be interested in SSRI class of medication.  Other options for treatment would include BuSpar or even Wellbutrin    -Patient given as needed lorazepam to take on an as-needed basis.  Explained to the patient that he should not have any heavy responsibility or significant travel that he needs to do until he knows how taking the medication will make him feel.    Prior to prescribing the controlled substance, a patient search was performed on the [place] prescription drug monitoring program web site.  There was no evidence of diversion or misuse.  Prescription provided

## 2024-09-06 NOTE — ASSESSMENT & PLAN NOTE
Sleep hygiene was discussed    -Patient given a limited supply of as needed lorazepam.  Explained the patient the negative action of benzodiazepines.  He should try taking this on the weekend know how will make him feel

## 2024-09-14 ENCOUNTER — APPOINTMENT (OUTPATIENT)
Dept: LAB | Facility: CLINIC | Age: 39
End: 2024-09-14
Payer: COMMERCIAL

## 2024-09-14 DIAGNOSIS — E66.9 OBESITY, CLASS I, BMI 30-34.9: ICD-10-CM

## 2024-09-14 DIAGNOSIS — F41.8 SITUATIONAL ANXIETY: ICD-10-CM

## 2024-09-14 DIAGNOSIS — Z00.00 ENCOUNTER FOR ANNUAL PHYSICAL EXAM: ICD-10-CM

## 2024-09-14 LAB
ALBUMIN SERPL BCG-MCNC: 4.4 G/DL (ref 3.5–5)
ALP SERPL-CCNC: 57 U/L (ref 34–104)
ALT SERPL W P-5'-P-CCNC: 41 U/L (ref 7–52)
ANION GAP SERPL CALCULATED.3IONS-SCNC: 11 MMOL/L (ref 4–13)
AST SERPL W P-5'-P-CCNC: 31 U/L (ref 13–39)
BASOPHILS # BLD AUTO: 0.04 THOUSANDS/ΜL (ref 0–0.1)
BASOPHILS NFR BLD AUTO: 1 % (ref 0–1)
BILIRUB SERPL-MCNC: 0.97 MG/DL (ref 0.2–1)
BUN SERPL-MCNC: 11 MG/DL (ref 5–25)
CALCIUM SERPL-MCNC: 9.1 MG/DL (ref 8.4–10.2)
CHLORIDE SERPL-SCNC: 103 MMOL/L (ref 96–108)
CHOLEST SERPL-MCNC: 220 MG/DL
CO2 SERPL-SCNC: 25 MMOL/L (ref 21–32)
CREAT SERPL-MCNC: 0.96 MG/DL (ref 0.6–1.3)
EOSINOPHIL # BLD AUTO: 0.56 THOUSAND/ΜL (ref 0–0.61)
EOSINOPHIL NFR BLD AUTO: 10 % (ref 0–6)
ERYTHROCYTE [DISTWIDTH] IN BLOOD BY AUTOMATED COUNT: 12.6 % (ref 11.6–15.1)
EST. AVERAGE GLUCOSE BLD GHB EST-MCNC: 100 MG/DL
GFR SERPL CREATININE-BSD FRML MDRD: 100 ML/MIN/1.73SQ M
GLUCOSE P FAST SERPL-MCNC: 85 MG/DL (ref 65–99)
HBA1C MFR BLD: 5.1 %
HCT VFR BLD AUTO: 43.9 % (ref 36.5–49.3)
HDLC SERPL-MCNC: 40 MG/DL
HGB BLD-MCNC: 15.1 G/DL (ref 12–17)
IMM GRANULOCYTES # BLD AUTO: 0.01 THOUSAND/UL (ref 0–0.2)
IMM GRANULOCYTES NFR BLD AUTO: 0 % (ref 0–2)
LDLC SERPL CALC-MCNC: 129 MG/DL (ref 0–100)
LYMPHOCYTES # BLD AUTO: 1.51 THOUSANDS/ΜL (ref 0.6–4.47)
LYMPHOCYTES NFR BLD AUTO: 27 % (ref 14–44)
MCH RBC QN AUTO: 29.9 PG (ref 26.8–34.3)
MCHC RBC AUTO-ENTMCNC: 34.4 G/DL (ref 31.4–37.4)
MCV RBC AUTO: 87 FL (ref 82–98)
MONOCYTES # BLD AUTO: 0.47 THOUSAND/ΜL (ref 0.17–1.22)
MONOCYTES NFR BLD AUTO: 9 % (ref 4–12)
NEUTROPHILS # BLD AUTO: 2.97 THOUSANDS/ΜL (ref 1.85–7.62)
NEUTS SEG NFR BLD AUTO: 53 % (ref 43–75)
NONHDLC SERPL-MCNC: 180 MG/DL
NRBC BLD AUTO-RTO: 0 /100 WBCS
PLATELET # BLD AUTO: 208 THOUSANDS/UL (ref 149–390)
PMV BLD AUTO: 11.3 FL (ref 8.9–12.7)
POTASSIUM SERPL-SCNC: 4 MMOL/L (ref 3.5–5.3)
PROT SERPL-MCNC: 6.8 G/DL (ref 6.4–8.4)
RBC # BLD AUTO: 5.05 MILLION/UL (ref 3.88–5.62)
SODIUM SERPL-SCNC: 139 MMOL/L (ref 135–147)
TRIGL SERPL-MCNC: 256 MG/DL
TSH SERPL DL<=0.05 MIU/L-ACNC: 3.23 UIU/ML (ref 0.45–4.5)
WBC # BLD AUTO: 5.56 THOUSAND/UL (ref 4.31–10.16)

## 2024-09-14 PROCEDURE — 36415 COLL VENOUS BLD VENIPUNCTURE: CPT

## 2024-09-14 PROCEDURE — 80053 COMPREHEN METABOLIC PANEL: CPT

## 2024-09-14 PROCEDURE — 85025 COMPLETE CBC W/AUTO DIFF WBC: CPT

## 2024-09-14 PROCEDURE — 80061 LIPID PANEL: CPT

## 2024-09-14 PROCEDURE — 84443 ASSAY THYROID STIM HORMONE: CPT

## 2024-09-14 PROCEDURE — 83036 HEMOGLOBIN GLYCOSYLATED A1C: CPT

## 2024-09-16 ENCOUNTER — TELEPHONE (OUTPATIENT)
Dept: FAMILY MEDICINE CLINIC | Facility: CLINIC | Age: 39
End: 2024-09-16

## 2024-09-16 NOTE — TELEPHONE ENCOUNTER
----- Message from Allen Middleton DO sent at 9/16/2024  9:57 AM EDT -----  Please call patient to schedule follow-up appointment to review lab results.  Can be virtual for short appointment to discuss his cholesterol

## 2024-09-19 ENCOUNTER — TELEMEDICINE (OUTPATIENT)
Dept: FAMILY MEDICINE CLINIC | Facility: CLINIC | Age: 39
End: 2024-09-19

## 2024-09-19 VITALS — BODY MASS INDEX: 30.75 KG/M2 | HEIGHT: 72 IN | WEIGHT: 227 LBS

## 2024-09-19 DIAGNOSIS — E66.9 OBESITY, CLASS I, BMI 30-34.9: Primary | ICD-10-CM

## 2024-09-19 DIAGNOSIS — E78.9 BORDERLINE HIGH CHOLESTEROL: ICD-10-CM

## 2024-09-19 NOTE — PROGRESS NOTES
Virtual Regular Visit  Name: Billy Barroso IV      : 1985      MRN: [id number]  Encounter Provider: Allen Middleton DO  Encounter Date: 2024   Encounter department: [facility]    Verification of patient location:    Patient is located at Home in the following state in which I hold an active license [place]    Assessment & Plan  Obesity, Class I, BMI 30-34.9  Patient's labs returned.  Normal hemoglobin A1c at 5.1%.  Borderline cholesterol    -Discussion with patient in regards to GLP-1 agonist and instructions for use.  Patient will be placed on Wegovy titration panel.  He will let me know if he experiences any adverse side effects but otherwise will check CMP and hemoglobin A1c in 6 months    Orders:    Semaglutide-Weight Management (WEGOVY) 0.25 MG/0.5ML; Inject 0.5 mL (0.25 mg total) under the skin once a week for 28 days    Semaglutide-Weight Management (WEGOVY) 0.5 MG/0.5ML; Inject 0.5 mL (0.5 mg total) under the skin once a week for 28 days Do not start before 2024.    Semaglutide-Weight Management (WEGOVY) 1 MG/0.5ML; Inject 0.5 mL (1 mg total) under the skin once a week for 28 days Do not start before 2024.    Semaglutide-Weight Management (WEGOVY) 1.7 MG/0.75ML; Inject 0.75 mL (1.7 mg total) under the skin once a week for 28 days Do not start before 2024.    Semaglutide-Weight Management (WEGOVY) 2.4 MG/0.75ML; Inject 0.75 mL (2.4 mg total) under the skin once a week for 28 days Do not start before 2025.    Comprehensive metabolic panel; Future    Hemoglobin A1C; Future    Lipid panel; Future    Borderline high cholesterol  Recommended watching dietary intake of fats and cholesterol.  He does exercise but does have issues with portion control    -Omega-3 fatty acids may be an option however placing him on GLP-1 agonist to help with weight loss would also likely result in lowering of his cholesterol    Repeat lipid panel ordered  to be done in 6 months    Orders:    Comprehensive metabolic panel; Future    Lipid panel; Future         Encounter provider Allen Middleton DO    The patient was identified by name and date of birth. Billy Barroso IV was informed that this is a telemedicine visit and that the visit is being conducted through the [employer] platform. He agrees to proceed..  My office door was closed. No one else was in the room.  He acknowledged consent and understanding of privacy and security of the video platform. The patient has agreed to participate and understands they can discontinue the visit at any time.    Patient is aware this is a billable service.     History of Present Illness     38-year-old male presents via video virtual visit at my request to review results of blood work that was performed this week.  Patient had normal CBC, CMP, hemoglobin A1c, TSH.  Borderline high cholesterol with total cholesterol 220, triglycerides 257, HDL 40, .  Patient does exercise but does have issues with eating late as well as portion control.  He is interested in GLP-1 agonist        History obtained from : patient  Review of Systems   Constitutional: Negative.    HENT: Negative.     Eyes: Negative.    Respiratory: Negative.     Cardiovascular: Negative.    Gastrointestinal: Negative.    Endocrine: Negative.    Genitourinary: Negative.    Musculoskeletal: Negative.    Skin: Negative.    Allergic/Immunologic: Negative.    Neurological: Negative.    Hematological: Negative.    Psychiatric/Behavioral: Negative.     All other systems reviewed and are negative.    Medical History Reviewed by provider this encounter:  Problems       Current Outpatient Medications on File Prior to Visit   Medication Sig Dispense Refill    LORazepam (Ativan) 0.5 mg tablet Take 1 tablet (0.5 mg total) by mouth every 8 (eight) hours as needed for anxiety 40 tablet 1     No current facility-administered medications on file prior to visit.           Objective     Ht 6' (1.829 m)   Wt 103 kg (227 lb)   BMI 30.79 kg/m²   Physical Exam  Vitals and nursing note reviewed.   Constitutional:       General: He is not in acute distress.     Appearance: Normal appearance. He is well-developed. He is not ill-appearing or diaphoretic.   HENT:      Head: Normocephalic and atraumatic.   Eyes:      Extraocular Movements: Extraocular movements intact.      Pupils: Pupils are equal, round, and reactive to light.   Pulmonary:      Effort: Pulmonary effort is normal.   Neurological:      General: No focal deficit present.      Mental Status: He is alert and oriented to person, place, and time.   Psychiatric:         Mood and Affect: Mood normal.         Behavior: Behavior normal.         Thought Content: Thought content normal.         Judgment: Judgment normal.         Recent Results (from the past 504 hour(s))   CBC and differential    Collection Time: 09/14/24  7:55 AM   Result Value Ref Range    WBC 5.56 4.31 - 10.16 Thousand/uL    RBC 5.05 3.88 - 5.62 Million/uL    Hemoglobin 15.1 12.0 - 17.0 g/dL    Hematocrit 43.9 36.5 - 49.3 %    MCV 87 82 - 98 fL    MCH 29.9 26.8 - 34.3 pg    MCHC 34.4 31.4 - 37.4 g/dL    RDW 12.6 11.6 - 15.1 %    MPV 11.3 8.9 - 12.7 fL    Platelets 208 149 - 390 Thousands/uL    nRBC 0 /100 WBCs    Segmented % 53 43 - 75 %    Immature Grans % 0 0 - 2 %    Lymphocytes % 27 14 - 44 %    Monocytes % 9 4 - 12 %    Eosinophils Relative 10 (H) 0 - 6 %    Basophils Relative 1 0 - 1 %    Absolute Neutrophils 2.97 1.85 - 7.62 Thousands/µL    Absolute Immature Grans 0.01 0.00 - 0.20 Thousand/uL    Absolute Lymphocytes 1.51 0.60 - 4.47 Thousands/µL    Absolute Monocytes 0.47 0.17 - 1.22 Thousand/µL    Eosinophils Absolute 0.56 0.00 - 0.61 Thousand/µL    Basophils Absolute 0.04 0.00 - 0.10 Thousands/µL   Comprehensive metabolic panel    Collection Time: 09/14/24  7:55 AM   Result Value Ref Range    Sodium 139 135 - 147 mmol/L    Potassium 4.0 3.5 - 5.3  mmol/L    Chloride 103 96 - 108 mmol/L    CO2 25 21 - 32 mmol/L    ANION GAP 11 4 - 13 mmol/L    BUN 11 5 - 25 mg/dL    Creatinine 0.96 0.60 - 1.30 mg/dL    Glucose, Fasting 85 65 - 99 mg/dL    Calcium 9.1 8.4 - 10.2 mg/dL    AST 31 13 - 39 U/L    ALT 41 7 - 52 U/L    Alkaline Phosphatase 57 34 - 104 U/L    Total Protein 6.8 6.4 - 8.4 g/dL    Albumin 4.4 3.5 - 5.0 g/dL    Total Bilirubin 0.97 0.20 - 1.00 mg/dL    eGFR 100 ml/min/1.73sq m   Hemoglobin A1C    Collection Time: 09/14/24  7:55 AM   Result Value Ref Range    Hemoglobin A1C 5.1 Normal 4.0-5.6%; PreDiabetic 5.7-6.4%; Diabetic >=6.5%; Glycemic control for adults with diabetes <7.0% %     mg/dl   Lipid panel    Collection Time: 09/14/24  7:55 AM   Result Value Ref Range    Cholesterol 220 (H) See Comment mg/dL    Triglycerides 256 (H) See Comment mg/dL    HDL, Direct 40 >=40 mg/dL    LDL Calculated 129 (H) 0 - 100 mg/dL    Non-HDL-Chol (CHOL-HDL) 180 mg/dl   TSH, 3rd generation with Free T4 reflex    Collection Time: 09/14/24  7:55 AM   Result Value Ref Range    TSH 3RD GENERATON 3.233 0.450 - 4.500 uIU/mL      Visit Time  Total Visit Duration: 15 minutes

## 2024-09-19 NOTE — ASSESSMENT & PLAN NOTE
Recommended watching dietary intake of fats and cholesterol.  He does exercise but does have issues with portion control    -Omega-3 fatty acids may be an option however placing him on GLP-1 agonist to help with weight loss would also likely result in lowering of his cholesterol    Repeat lipid panel ordered to be done in 6 months    Orders:    Comprehensive metabolic panel; Future    Lipid panel; Future

## 2024-09-19 NOTE — ASSESSMENT & PLAN NOTE
Patient's labs returned.  Normal hemoglobin A1c at 5.1%.  Borderline cholesterol    -Discussion with patient in regards to GLP-1 agonist and instructions for use.  Patient will be placed on Wegovy titration panel.  He will let me know if he experiences any adverse side effects but otherwise will check CMP and hemoglobin A1c in 6 months    Orders:    Semaglutide-Weight Management (WEGOVY) 0.25 MG/0.5ML; Inject 0.5 mL (0.25 mg total) under the skin once a week for 28 days    Semaglutide-Weight Management (WEGOVY) 0.5 MG/0.5ML; Inject 0.5 mL (0.5 mg total) under the skin once a week for 28 days Do not start before October 17, 2024.    Semaglutide-Weight Management (WEGOVY) 1 MG/0.5ML; Inject 0.5 mL (1 mg total) under the skin once a week for 28 days Do not start before November 14, 2024.    Semaglutide-Weight Management (WEGOVY) 1.7 MG/0.75ML; Inject 0.75 mL (1.7 mg total) under the skin once a week for 28 days Do not start before December 12, 2024.    Semaglutide-Weight Management (WEGOVY) 2.4 MG/0.75ML; Inject 0.75 mL (2.4 mg total) under the skin once a week for 28 days Do not start before January 9, 2025.    Comprehensive metabolic panel; Future    Hemoglobin A1C; Future    Lipid panel; Future

## 2024-09-20 ENCOUNTER — TELEPHONE (OUTPATIENT)
Age: 39
End: 2024-09-20

## 2024-09-20 NOTE — TELEPHONE ENCOUNTER
PA for wegovy 0.25 mg SUBMITTED     via    []CMM-KEY:    [x]Johanne-Case ID # [id number]   []Faxed to plan   []Other website    []Phone call Case ID #      Office notes sent, clinical questions answered. Awaiting determination    Turnaround time for your insurance to make a decision on your Prior Authorization can take 7-21 business days.

## 2024-09-23 NOTE — TELEPHONE ENCOUNTER
PA for wegovy 0.25 mg DENIED    Reason:(Screenshot if applicable)        Message sent to office clinical pool Yes    Denial letter scanned into Media No DENIAL BEING FAXED TO OFFICE    Appeal started No (Provider will need to decide if appeal is warranted and send clinical documentation to Prior Authorization Team for initiation.)    **Please follow up with your patient regarding denial and next steps**

## 2024-10-03 ENCOUNTER — TELEPHONE (OUTPATIENT)
Dept: FAMILY MEDICINE CLINIC | Facility: CLINIC | Age: 39
End: 2024-10-03

## 2024-10-03 NOTE — TELEPHONE ENCOUNTER
As per Dr. Middleton,    Please resubmit authorization for Wegovy.  Initially, the information submitted was incorrect.  Can you resubmit that patient has not been able to lose in excess of 1 pound per month and he had been on a weight management program for more than 6 months (with a dietician).

## 2024-10-09 NOTE — TELEPHONE ENCOUNTER
JADIEL ALFREDVY 0.25 MG/0.5ML APPROVED     Date(s) approved October 8, 2024 to October 8, 2025     Case #     Patient advised by          []MyChart Message  []Phone call   [x]LMOM  []L/M to call office as no active Communication consent on file  []Unable to leave detailed message as VM not approved on Communication consent       Pharmacy advised by    [x]Fax  []Phone call

## 2024-10-11 NOTE — TELEPHONE ENCOUNTER
FYI: Pt wanted to  confirm that the WEGOVY 0.25 MG/0.5ML was sent to:   [id number] - [place], [place] [facility] [address] [facility] [address]    Pt was told yes.

## 2024-11-15 ENCOUNTER — TELEPHONE (OUTPATIENT)
Age: 39
End: 2024-11-15

## 2024-11-15 DIAGNOSIS — E66.811 OBESITY, CLASS I, BMI 30-34.9: ICD-10-CM

## 2024-11-15 NOTE — TELEPHONE ENCOUNTER
Patient called in and requested a hard copy of Semaglutide-Weight Management (WEGOVY) 1 MG/0.5ML so that he can attempt to purchase it once he he locate it.    Please advise if it can be sent to him

## 2024-11-28 DIAGNOSIS — E66.811 OBESITY, CLASS I, BMI 30-34.9: ICD-10-CM

## 2024-12-02 RX ORDER — SEMAGLUTIDE 0.5 MG/.5ML
INJECTION, SOLUTION SUBCUTANEOUS
Qty: 2 ML | Refills: 0 | OUTPATIENT
Start: 2024-12-02

## 2024-12-08 DIAGNOSIS — E66.811 OBESITY, CLASS I, BMI 30-34.9: ICD-10-CM

## 2024-12-09 RX ORDER — SEMAGLUTIDE 1 MG/.5ML
INJECTION, SOLUTION SUBCUTANEOUS
Qty: 2 ML | Refills: 0 | OUTPATIENT
Start: 2024-12-09

## 2024-12-09 NOTE — TELEPHONE ENCOUNTER
Please inform the patient that his next 2 dosage titrations of Wegovy are at [facility] pharmacy.  He just needs to call them to request the next dose   Scribe Attestation (For Scribes USE Only)...

## 2024-12-26 ENCOUNTER — OFFICE VISIT (OUTPATIENT)
Dept: URGENT CARE | Age: 39
End: 2024-12-26
Payer: COMMERCIAL

## 2024-12-26 VITALS
HEART RATE: 70 BPM | BODY MASS INDEX: 28.62 KG/M2 | OXYGEN SATURATION: 100 % | RESPIRATION RATE: 20 BRPM | WEIGHT: 211 LBS | TEMPERATURE: 98 F

## 2024-12-26 DIAGNOSIS — J06.9 VIRAL URI WITH COUGH: Primary | ICD-10-CM

## 2024-12-26 PROCEDURE — G0382 LEV 3 HOSP TYPE B ED VISIT: HCPCS | Performed by: STUDENT IN AN ORGANIZED HEALTH CARE EDUCATION/TRAINING PROGRAM

## 2024-12-26 PROCEDURE — S9083 URGENT CARE CENTER GLOBAL: HCPCS | Performed by: STUDENT IN AN ORGANIZED HEALTH CARE EDUCATION/TRAINING PROGRAM

## 2024-12-26 RX ORDER — GUAIFENESIN 200 MG/10ML
200 LIQUID ORAL 3 TIMES DAILY PRN
COMMUNITY

## 2024-12-26 RX ORDER — BROMPHENIRAMINE MALEATE, PSEUDOEPHEDRINE HYDROCHLORIDE, AND DEXTROMETHORPHAN HYDROBROMIDE 2; 30; 10 MG/5ML; MG/5ML; MG/5ML
5 SYRUP ORAL 3 TIMES DAILY PRN
Qty: 120 ML | Refills: 0 | Status: SHIPPED | OUTPATIENT
Start: 2024-12-26

## 2024-12-26 NOTE — PATIENT INSTRUCTIONS
Your symptoms are consistent with a viral upper respiratory infection.  I believe that most of the cough is coming from postnasal drip.  I am prescribing a medication to help reduce the postnasal drip, I also recommend a steamy shower before bed to help with congestion.  Using a humidifier in your room may also be helpful.  If you can tolerate it, nasal saline spray or drops around the time of the shower can also be helpful.    If your symptoms continue to worsen past day 7 or you feel that you are improving and then worsening again, please return for reevaluation with us or PCP.  If you have any severe symptoms such as short of breath, please go to the ER.

## 2024-12-26 NOTE — PROGRESS NOTES
[facility]        NAME: Billy Barroso IV is a 39 y.o. male  : 1985    MRN: [id number]  DATE: 2024  TIME: 3:46 PM    Assessment and Plan   Viral URI with cough [J06.9]  1. Viral URI with cough  brompheniramine-pseudoephedrine-DM 30-2-10 MG/5ML syrup            Patient Instructions   Your symptoms are consistent with a viral upper respiratory infection.  I believe that most of the cough is coming from postnasal drip.  I am prescribing a medication to help reduce the postnasal drip, I also recommend a steamy shower before bed to help with congestion.  Using a humidifier in your room may also be helpful.  If you can tolerate it, nasal saline spray or drops around the time of the shower can also be helpful.    If your symptoms continue to worsen past day 7 or you feel that you are improving and then worsening again, please return for reevaluation with us or PCP.  If you have any severe symptoms such as short of breath, please go to the ER.      Follow up with PCP in 3-5 days.  Proceed to  ER if symptoms worsen.    If tests have been performed at [facility], our office will contact you with results if changes need to be made to the care plan discussed with you at the visit.  [facility] on [facility]    Chief Complaint     Chief Complaint   Patient presents with    Cough     Sinus congestion and cough x several days... has had repeated cold all season..         History of Present Illness       Patient presents for evaluation of cough.  He notes that he has had a few episodes of cough from suspected viral illness over the season.  Tends to get these after being around a lot of people.  Usually has about 1/year, has had 2 already this season.  He notes similar symptoms which started with congestion, cough, sore throat, takes about 2 weeks for the cough to fully resolve.  Current episode started about 5 days ago, sinus congestion, postnasal drip, cough.  When he  "blows his nose, not able to get anything out, when he coughs feels as though he needs to bring something up but is not able to.  Has been taking Robitussin DM.  Yesterday his cough was particularly bad and sounded like a \"smoker's cough\".  He is also having some sore throat due to irritation from the cough.  No fever, no wheezing, no shortness of breath.    Cough        Review of Systems   Review of Systems   Respiratory:  Positive for cough.    All other systems reviewed and are negative.        Current Medications       Current Outpatient Medications:     brompheniramine-pseudoephedrine-DM 30-2-10 MG/5ML syrup, Take 5 mL by mouth 3 (three) times a day as needed for congestion or cough, Disp: 120 mL, Rfl: 0    guaiFENesin (ROBITUSSIN) 100 MG/5ML oral liquid, Take 200 mg by mouth 3 (three) times a day as needed for cough, Disp: , Rfl:     LORazepam (Ativan) 0.5 mg tablet, Take 1 tablet (0.5 mg total) by mouth every 8 (eight) hours as needed for anxiety, Disp: 40 tablet, Rfl: 0    Semaglutide-Weight Management (WEGOVY) 1.7 MG/0.75ML, Inject 0.75 mL (1.7 mg total) under the skin once a week for 28 days Do not start before December 12, 2024., Disp: 3 mL, Rfl: 0    [START ON 1/9/2025] Semaglutide-Weight Management (WEGOVY) 2.4 MG/0.75ML, Inject 0.75 mL (2.4 mg total) under the skin once a week for 28 days Do not start before January 9, 2025., Disp: 3 mL, Rfl: 0    Current Allergies     Allergies as of 12/26/2024 - Reviewed 12/26/2024   Allergen Reaction Noted    Amoxicillin GI Intolerance 05/02/2023    Nuts - food allergy  11/09/2017            The following portions of the patient's history were reviewed and updated as appropriate: allergies, current medications, past family history, past medical history, past social history, past surgical history and problem list.     No past medical history on file.    Past Surgical History:   Procedure Laterality Date    EYE SURGERY      PENILE CYST REMOVAL  02/09/2018    Dr. Song  "       Family History   Problem Relation Age of Onset    Diabetes Father     Heart disease Father     Hypertension Father          Medications have been verified.        Objective   Pulse 70   Temp 98 °F (36.7 °C)   Resp 20   Wt 95.7 kg (211 lb)   SpO2 100%   BMI 28.62 kg/m²   No LMP for male patient.       Physical Exam     Physical Exam  Vitals and nursing note reviewed.   Constitutional:       General: He is not in acute distress.     Appearance: Normal appearance. He is not ill-appearing or toxic-appearing.   HENT:      Head: Normocephalic and atraumatic.      Right Ear: External ear normal.      Left Ear: External ear normal.      Nose: Congestion and rhinorrhea present.      Mouth/Throat:      Mouth: Mucous membranes are moist.   Eyes:      Extraocular Movements: Extraocular movements intact.   Cardiovascular:      Rate and Rhythm: Normal rate and regular rhythm.      Heart sounds: Normal heart sounds.   Pulmonary:      Effort: Pulmonary effort is normal. No respiratory distress.      Breath sounds: Normal breath sounds. No stridor. No wheezing, rhonchi or rales.   Skin:     General: Skin is warm and dry.      Capillary Refill: Capillary refill takes less than 2 seconds.      Findings: No rash.   Neurological:      Mental Status: He is alert.      Gait: Gait normal.   Psychiatric:         Behavior: Behavior normal.

## 2025-01-13 DIAGNOSIS — E66.811 OBESITY, CLASS I, BMI 30-34.9: ICD-10-CM

## 2025-01-14 RX ORDER — SEMAGLUTIDE 1.7 MG/.75ML
INJECTION, SOLUTION SUBCUTANEOUS
Qty: 3 ML | Refills: 0 | Status: SHIPPED | OUTPATIENT
Start: 2025-01-14

## 2025-01-29 DIAGNOSIS — F41.8 SITUATIONAL ANXIETY: ICD-10-CM

## 2025-01-30 RX ORDER — LORAZEPAM 0.5 MG/1
0.5 TABLET ORAL EVERY 8 HOURS PRN
Qty: 40 TABLET | Refills: 0 | Status: SHIPPED | OUTPATIENT
Start: 2025-01-30

## 2025-01-30 NOTE — TELEPHONE ENCOUNTER
Do not mind doing a refill of lorazepam however he will need labs which have been ordered for March and a follow-up appointment.  Please let the patient know that.  He is on Wegovy for weight loss and since he is requesting a refill of controlled benzo then he has to be seen at least every 6 months and right now I am booking out quite some time

## 2025-02-17 DIAGNOSIS — E66.811 OBESITY, CLASS I, BMI 30-34.9: Primary | ICD-10-CM

## 2025-02-17 RX ORDER — SEMAGLUTIDE 2.4 MG/.75ML
INJECTION, SOLUTION SUBCUTANEOUS
Qty: 3 ML | Refills: 1 | Status: SHIPPED | OUTPATIENT
Start: 2025-02-17

## 2025-03-10 ENCOUNTER — RA CDI HCC (OUTPATIENT)
Dept: OTHER | Facility: HOSPITAL | Age: 40
End: 2025-03-10

## 2025-03-10 PROBLEM — Z00.00 ENCOUNTER FOR ANNUAL PHYSICAL EXAM: Status: RESOLVED | Noted: 2024-09-06 | Resolved: 2025-03-10

## 2025-03-10 PROBLEM — E66.811 OBESITY, CLASS I, BMI 30-34.9: Status: RESOLVED | Noted: 2024-09-06 | Resolved: 2025-03-10

## 2025-03-10 NOTE — PROGRESS NOTES
HCC coding opportunities       Chart reviewed, no opportunity found: CHART REVIEWED, NO OPPORTUNITY FOUND      This is a reminder to address (resolve/update/assess) ALL HCC (risk adjustment) codes as found on active problem list for 2025 as patient scores reset to zero TREVA.  Patients Insurance        Commercial Insurance: [employer]

## 2025-03-15 ENCOUNTER — APPOINTMENT (OUTPATIENT)
Dept: LAB | Facility: CLINIC | Age: 40
End: 2025-03-15
Payer: COMMERCIAL

## 2025-03-15 DIAGNOSIS — E66.811 OBESITY, CLASS I, BMI 30-34.9: ICD-10-CM

## 2025-03-15 DIAGNOSIS — E78.9 BORDERLINE HIGH CHOLESTEROL: ICD-10-CM

## 2025-03-15 LAB
ALBUMIN SERPL BCG-MCNC: 4.6 G/DL (ref 3.5–5)
ALP SERPL-CCNC: 63 U/L (ref 34–104)
ALT SERPL W P-5'-P-CCNC: 44 U/L (ref 7–52)
ANION GAP SERPL CALCULATED.3IONS-SCNC: 11 MMOL/L (ref 4–13)
AST SERPL W P-5'-P-CCNC: 29 U/L (ref 13–39)
BILIRUB SERPL-MCNC: 0.86 MG/DL (ref 0.2–1)
BUN SERPL-MCNC: 13 MG/DL (ref 5–25)
CALCIUM SERPL-MCNC: 9.6 MG/DL (ref 8.4–10.2)
CHLORIDE SERPL-SCNC: 103 MMOL/L (ref 96–108)
CHOLEST SERPL-MCNC: 188 MG/DL (ref ?–200)
CO2 SERPL-SCNC: 27 MMOL/L (ref 21–32)
CREAT SERPL-MCNC: 1 MG/DL (ref 0.6–1.3)
EST. AVERAGE GLUCOSE BLD GHB EST-MCNC: 91 MG/DL
GFR SERPL CREATININE-BSD FRML MDRD: 95 ML/MIN/1.73SQ M
GLUCOSE P FAST SERPL-MCNC: 94 MG/DL (ref 65–99)
HBA1C MFR BLD: 4.8 %
HDLC SERPL-MCNC: 40 MG/DL
LDLC SERPL CALC-MCNC: 120 MG/DL (ref 0–100)
NONHDLC SERPL-MCNC: 148 MG/DL
POTASSIUM SERPL-SCNC: 4.2 MMOL/L (ref 3.5–5.3)
PROT SERPL-MCNC: 7 G/DL (ref 6.4–8.4)
SODIUM SERPL-SCNC: 141 MMOL/L (ref 135–147)
TRIGL SERPL-MCNC: 142 MG/DL (ref ?–150)

## 2025-03-15 PROCEDURE — 83036 HEMOGLOBIN GLYCOSYLATED A1C: CPT

## 2025-03-15 PROCEDURE — 36415 COLL VENOUS BLD VENIPUNCTURE: CPT

## 2025-03-15 PROCEDURE — 80053 COMPREHEN METABOLIC PANEL: CPT

## 2025-03-15 PROCEDURE — 80061 LIPID PANEL: CPT

## 2025-03-18 ENCOUNTER — RESULTS FOLLOW-UP (OUTPATIENT)
Dept: FAMILY MEDICINE CLINIC | Facility: CLINIC | Age: 40
End: 2025-03-18

## 2025-03-21 ENCOUNTER — OFFICE VISIT (OUTPATIENT)
Dept: FAMILY MEDICINE CLINIC | Facility: CLINIC | Age: 40
End: 2025-03-21
Payer: COMMERCIAL

## 2025-03-21 VITALS
WEIGHT: 222 LBS | SYSTOLIC BLOOD PRESSURE: 118 MMHG | HEIGHT: 72 IN | RESPIRATION RATE: 16 BRPM | HEART RATE: 70 BPM | DIASTOLIC BLOOD PRESSURE: 68 MMHG | OXYGEN SATURATION: 98 % | BODY MASS INDEX: 30.07 KG/M2

## 2025-03-21 DIAGNOSIS — E78.9 BORDERLINE HIGH CHOLESTEROL: ICD-10-CM

## 2025-03-21 DIAGNOSIS — E66.811 OBESITY, CLASS I, BMI 30-34.9: Primary | ICD-10-CM

## 2025-03-21 DIAGNOSIS — F41.8 SITUATIONAL ANXIETY: ICD-10-CM

## 2025-03-21 DIAGNOSIS — Z00.00 ANNUAL PHYSICAL EXAM: ICD-10-CM

## 2025-03-21 PROBLEM — E66.9 OBESITY (BMI 30-39.9): Status: ACTIVE | Noted: 2025-03-21

## 2025-03-21 PROBLEM — E66.9 OBESITY (BMI 30-39.9): Status: RESOLVED | Noted: 2025-03-21 | Resolved: 2025-03-21

## 2025-03-21 PROCEDURE — 99214 OFFICE O/P EST MOD 30 MIN: CPT | Performed by: FAMILY MEDICINE

## 2025-03-21 RX ORDER — SEMAGLUTIDE 2.4 MG/.75ML
INJECTION, SOLUTION SUBCUTANEOUS
Qty: 3 ML | Refills: 5 | Status: SHIPPED | OUTPATIENT
Start: 2025-03-21

## 2025-03-21 RX ORDER — LORAZEPAM 0.5 MG/1
0.5 TABLET ORAL EVERY 8 HOURS PRN
Qty: 40 TABLET | Refills: 1 | Status: SHIPPED | OUTPATIENT
Start: 2025-03-21

## 2025-03-21 NOTE — PROGRESS NOTES
Subjective:      Patient ID: Billy Barroso IV is a 39 y.o. male.    39-year-old male with history of obesity, work-related stress and anxiety presents for 6-month follow-up of chronic conditions.  Patient has been extremely successful with being able to lose weight on Wegovy.  No significant side effects from medication.  Cholesterol has improved tremendously with weight loss.  He has been watching his diet and has been trying to exercise doing some weight lifting and running approximately 15 miles per week.  He is very pleased that he has been able to lose weight over the winter.  Patient also finds that the as needed lorazepam works very well for helping to ease his mind and allow him to relax and sleep.  He is taking a as needed lorazepam normally on Sunday night as he anticipates getting ready for the work week and then usually once again at some point during the week.  Work has been very steady and busy for him.        Past Medical History:   Diagnosis Date   • Encounter for annual physical exam 09/06/2024   • Obesity, Class I, BMI 30-34.9 09/06/2024       Family History   Problem Relation Age of Onset   • Diabetes Father    • Heart disease Father    • Hypertension Father        Past Surgical History:   Procedure Laterality Date   • EYE SURGERY     • PENILE CYST REMOVAL  02/09/2018    Dr. Song         reports that he has never smoked. He has never used smokeless tobacco. He reports current alcohol use. He reports that he does not use drugs.      Current Outpatient Medications:   •  LORazepam (Ativan) 0.5 mg tablet, Take 1 tablet (0.5 mg total) by mouth every 8 (eight) hours as needed for anxiety, Disp: 40 tablet, Rfl: 1  •  Semaglutide-Weight Management (Wegovy) 2.4 MG/0.75ML, Inject 2.4 mg under the skin weekly, Disp: 3 mL, Rfl: 5    The following portions of the patient's history were reviewed and updated as appropriate: allergies, current medications, past family history, past medical history, past  social history, past surgical history and problem list.    Review of Systems   Constitutional:  Positive for unexpected weight change (Has lost weight on Wegovy).   HENT: Negative.     Eyes: Negative.    Respiratory: Negative.     Cardiovascular: Negative.    Gastrointestinal: Negative.    Endocrine: Negative.    Genitourinary: Negative.    Musculoskeletal: Negative.    Skin: Negative.    Allergic/Immunologic: Negative.    Neurological: Negative.    Hematological: Negative.    Psychiatric/Behavioral: Negative.     All other systems reviewed and are negative.          Objective:    /68   Pulse 70   Resp 16   Ht 6' (1.829 m)   Wt 101 kg (222 lb)   SpO2 98%   BMI 30.11 kg/m²      Physical Exam  Vitals and nursing note reviewed.   Constitutional:       General: He is not in acute distress.     Appearance: Normal appearance. He is well-developed. He is obese. He is not ill-appearing.   HENT:      Head: Normocephalic and atraumatic.   Eyes:      Extraocular Movements: Extraocular movements intact.      Conjunctiva/sclera: Conjunctivae normal.      Pupils: Pupils are equal, round, and reactive to light.   Cardiovascular:      Rate and Rhythm: Normal rate and regular rhythm.      Pulses: Normal pulses.      Heart sounds: Normal heart sounds. No murmur heard.  Pulmonary:      Effort: Pulmonary effort is normal.      Breath sounds: Normal breath sounds.   Abdominal:      General: Abdomen is flat. Bowel sounds are normal.      Palpations: Abdomen is soft.   Musculoskeletal:         General: Normal range of motion.      Cervical back: Normal range of motion and neck supple.   Skin:     General: Skin is warm and dry.   Neurological:      General: No focal deficit present.      Mental Status: He is alert and oriented to person, place, and time.   Psychiatric:         Mood and Affect: Mood normal.         Behavior: Behavior normal.         Thought Content: Thought content normal.         Judgment: Judgment normal.            Recent Results (from the past 6 weeks)   Comprehensive metabolic panel    Collection Time: 03/15/25 11:12 AM   Result Value Ref Range    Sodium 141 135 - 147 mmol/L    Potassium 4.2 3.5 - 5.3 mmol/L    Chloride 103 96 - 108 mmol/L    CO2 27 21 - 32 mmol/L    ANION GAP 11 4 - 13 mmol/L    BUN 13 5 - 25 mg/dL    Creatinine 1.00 0.60 - 1.30 mg/dL    Glucose, Fasting 94 65 - 99 mg/dL    Calcium 9.6 8.4 - 10.2 mg/dL    AST 29 13 - 39 U/L    ALT 44 7 - 52 U/L    Alkaline Phosphatase 63 34 - 104 U/L    Total Protein 7.0 6.4 - 8.4 g/dL    Albumin 4.6 3.5 - 5.0 g/dL    Total Bilirubin 0.86 0.20 - 1.00 mg/dL    eGFR 95 ml/min/1.73sq m   Hemoglobin A1C    Collection Time: 03/15/25 11:12 AM   Result Value Ref Range    Hemoglobin A1C 4.8 Normal 4.0-5.6%; PreDiabetic 5.7-6.4%; Diabetic >=6.5%; Glycemic control for adults with diabetes <7.0% %    EAG 91 mg/dl   Lipid panel    Collection Time: 03/15/25 11:12 AM   Result Value Ref Range    Cholesterol 188 See Comment mg/dL    Triglycerides 142 See Comment mg/dL    HDL, Direct 40 >=40 mg/dL    LDL Calculated 120 (H) 0 - 100 mg/dL    Non-HDL-Chol (CHOL-HDL) 148 mg/dl       Assessment/Plan:    Situational anxiety  Work-related stress and anxiety which does affect his ability to sleep at night    -Patient has done very well on lorazepam taken on an as needed basis.  He does use this sparingly.    -Refill provided of as needed lorazepam    Prior to prescribing the controlled substance, a patient search was performed on the [place] prescription drug monitoring program web site.  There was no evidence of diversion or misuse.  Prescription provided      Borderline high cholesterol  Cholesterol has improved tremendously with weight loss.  Repeat lipid panel to be done in 6 months    Obesity, Class I, BMI 30-34.9  Prior Authorization Clinical Questions for Weight Management Pharmacotherapy    1. Does the patient have a contrainidcation to medication prescribed for weight  management?: No  2. Does the patient have a diagnosis of obesity, confirmed by a BMI greater than or equal to 30 kg/m^2?: Yes  3. Does the patient have a BMI of greater than or equal to 27 kg/m^2 with at least one weight-related comorbidity/risk factor/complication (e.g. diabetes, dyslipidemia, coronary artery disease)?: No  4. Weight-related co-morbidities/risk factors: metabolic syndrome, dyslipidemia  7. Has the patient been on a weight loss regimen of low-calorie diet, increased physical activity, and lifestyle modifications for a minimum of 6 months?: Yes  8. Has the patient completed a comprehensive weight loss program (ie, Weight Watchers, Noom, Bariatrics, other mikal on phone)? If so, what?: No  9. Does the patient have a history of type 2 diabetes?: No  10. Has the member tried and failed other weight loss medication within the past 12 months?: No  11. Will the member use requested medication in combination with another GLP agonist or weight loss drug?: No  12. Is the medication a controlled substance?: No  For renewals: Has the patient had a positive outcome with current weight management medication (i.e., change in body weight of at least 4-5% after 12-16 weeks on maximally tolerated dose)?: Yes     Baseline weight (in pounds): 247 lbs  Current weight (in pounds): 222 lbs  Weight loss percentage: -10.12%       The patient has had significant weight loss on Wegovy during a long winter.  He has been watching his diet as an activity.  He will continue on Wegovy 2.4 mg weekly until attaining goal weight.  Target weight 190-195lbs.  Patient has medication authorization through October 2025          Problem List Items Addressed This Visit        Behavioral Health    Situational anxiety    Work-related stress and anxiety which does affect his ability to sleep at night    -Patient has done very well on lorazepam taken on an as needed basis.  He does use this sparingly.    -Refill provided of as needed  lorazepam    Prior to prescribing the controlled substance, a patient search was performed on the [place] prescription drug monitoring program web site.  There was no evidence of diversion or misuse.  Prescription provided           Relevant Medications    LORazepam (Ativan) 0.5 mg tablet       Other    Annual physical exam    Relevant Orders    CBC and differential    Comprehensive metabolic panel    Lipid panel    TSH, 3rd generation with Free T4 reflex    Borderline high cholesterol    Cholesterol has improved tremendously with weight loss.  Repeat lipid panel to be done in 6 months         RESOLVED: Obesity, Class I, BMI 30-34.9 - Primary    Prior Authorization Clinical Questions for Weight Management Pharmacotherapy    1. Does the patient have a contrainidcation to medication prescribed for weight management?: No  2. Does the patient have a diagnosis of obesity, confirmed by a BMI greater than or equal to 30 kg/m^2?: Yes  3. Does the patient have a BMI of greater than or equal to 27 kg/m^2 with at least one weight-related comorbidity/risk factor/complication (e.g. diabetes, dyslipidemia, coronary artery disease)?: No  4. Weight-related co-morbidities/risk factors: metabolic syndrome, dyslipidemia  7. Has the patient been on a weight loss regimen of low-calorie diet, increased physical activity, and lifestyle modifications for a minimum of 6 months?: Yes  8. Has the patient completed a comprehensive weight loss program (ie, Weight Watchers, Noom, Bariatrics, other mikal on phone)? If so, what?: No  9. Does the patient have a history of type 2 diabetes?: No  10. Has the member tried and failed other weight loss medication within the past 12 months?: No  11. Will the member use requested medication in combination with another GLP agonist or weight loss drug?: No  12. Is the medication a controlled substance?: No  For renewals: Has the patient had a positive outcome with current weight management medication  (i.e., change in body weight of at least 4-5% after 12-16 weeks on maximally tolerated dose)?: Yes     Baseline weight (in pounds): 247 lbs  Current weight (in pounds): 222 lbs  Weight loss percentage: -10.12%       The patient has had significant weight loss on Wegovy during a long winter.  He has been watching his diet as an activity.  He will continue on Wegovy 2.4 mg weekly until attaining goal weight.  Target weight 190-195lbs.  Patient has medication authorization through October 2025         Relevant Medications    Semaglutide-Weight Management (Wegovy) 2.4 MG/0.75ML    Other Relevant Orders    Hemoglobin A1C

## 2025-03-21 NOTE — ASSESSMENT & PLAN NOTE
Prior Authorization Clinical Questions for Weight Management Pharmacotherapy    1. Does the patient have a contrainidcation to medication prescribed for weight management?: No  2. Does the patient have a diagnosis of obesity, confirmed by a BMI greater than or equal to 30 kg/m^2?: Yes  3. Does the patient have a BMI of greater than or equal to 27 kg/m^2 with at least one weight-related comorbidity/risk factor/complication (e.g. diabetes, dyslipidemia, coronary artery disease)?: No  4. Weight-related co-morbidities/risk factors: metabolic syndrome, dyslipidemia  7. Has the patient been on a weight loss regimen of low-calorie diet, increased physical activity, and lifestyle modifications for a minimum of 6 months?: Yes  8. Has the patient completed a comprehensive weight loss program (ie, Weight Watchers, Noom, Bariatrics, other mikal on phone)? If so, what?: No  9. Does the patient have a history of type 2 diabetes?: No  10. Has the member tried and failed other weight loss medication within the past 12 months?: No  11. Will the member use requested medication in combination with another GLP agonist or weight loss drug?: No  12. Is the medication a controlled substance?: No  For renewals: Has the patient had a positive outcome with current weight management medication (i.e., change in body weight of at least 4-5% after 12-16 weeks on maximally tolerated dose)?: Yes     Baseline weight (in pounds): 247 lbs  Current weight (in pounds): 222 lbs  Weight loss percentage: -10.12%       The patient has had significant weight loss on Wegovy during a long winter.  He has been watching his diet as an activity.  He will continue on Wegovy 2.4 mg weekly until attaining goal weight.  Target weight 190-195lbs.  Patient has medication authorization through October 2025

## 2025-03-21 NOTE — ASSESSMENT & PLAN NOTE
Work-related stress and anxiety which does affect his ability to sleep at night    -Patient has done very well on lorazepam taken on an as needed basis.  He does use this sparingly.    -Refill provided of as needed lorazepam    Prior to prescribing the controlled substance, a patient search was performed on the [place] prescription drug monitoring program web site.  There was no evidence of diversion or misuse.  Prescription provided

## 2025-03-21 NOTE — ASSESSMENT & PLAN NOTE
Cholesterol has improved tremendously with weight loss.  Repeat lipid panel to be done in 6 months